# Patient Record
Sex: MALE | Race: WHITE
[De-identification: names, ages, dates, MRNs, and addresses within clinical notes are randomized per-mention and may not be internally consistent; named-entity substitution may affect disease eponyms.]

---

## 2019-08-25 ENCOUNTER — HOSPITAL ENCOUNTER (EMERGENCY)
Dept: HOSPITAL 62 - ER | Age: 44
Discharge: ANOTHER HOSPITAL | End: 2019-08-25
Payer: MEDICAID

## 2019-08-25 VITALS — DIASTOLIC BLOOD PRESSURE: 91 MMHG | SYSTOLIC BLOOD PRESSURE: 134 MMHG

## 2019-08-25 DIAGNOSIS — D72.829: ICD-10-CM

## 2019-08-25 DIAGNOSIS — F17.210: ICD-10-CM

## 2019-08-25 DIAGNOSIS — I10: ICD-10-CM

## 2019-08-25 DIAGNOSIS — G89.18: ICD-10-CM

## 2019-08-25 DIAGNOSIS — K75.0: Primary | ICD-10-CM

## 2019-08-25 LAB
ABSOLUTE LYMPHOCYTES# (MANUAL): 0.9 10^3/UL (ref 0.5–4.7)
ABSOLUTE MONOCYTES # (MANUAL): 1 10^3/UL (ref 0.1–1.4)
ADD MANUAL DIFF: YES
ALBUMIN SERPL-MCNC: 4.8 G/DL (ref 3.5–5)
ALP SERPL-CCNC: 430 U/L (ref 38–126)
ANION GAP SERPL CALC-SCNC: 14 MMOL/L (ref 5–19)
ANISOCYTOSIS BLD QL SMEAR: (no result)
APPEARANCE UR: (no result)
APTT PPP: YELLOW S
AST SERPL-CCNC: 82 U/L (ref 17–59)
BASOPHILS NFR BLD MANUAL: 0 % (ref 0–2)
BILIRUB DIRECT SERPL-MCNC: 0.4 MG/DL (ref 0–0.4)
BILIRUB SERPL-MCNC: 0.6 MG/DL (ref 0.2–1.3)
BILIRUB UR QL STRIP: NEGATIVE
BUN SERPL-MCNC: 18 MG/DL (ref 7–20)
CALCIUM: 10.4 MG/DL (ref 8.4–10.2)
CHLORIDE SERPL-SCNC: 97 MMOL/L (ref 98–107)
CO2 SERPL-SCNC: 29 MMOL/L (ref 22–30)
EOSINOPHIL NFR BLD MANUAL: 0 % (ref 0–6)
ERYTHROCYTE [DISTWIDTH] IN BLOOD BY AUTOMATED COUNT: 15.5 % (ref 11.5–14)
GLUCOSE SERPL-MCNC: 117 MG/DL (ref 75–110)
GLUCOSE UR STRIP-MCNC: NEGATIVE MG/DL
HCT VFR BLD CALC: 40.6 % (ref 37.9–51)
HGB BLD-MCNC: 13.5 G/DL (ref 13.5–17)
KETONES UR STRIP-MCNC: NEGATIVE MG/DL
MCH RBC QN AUTO: 30.1 PG (ref 27–33.4)
MCHC RBC AUTO-ENTMCNC: 33.3 G/DL (ref 32–36)
MCV RBC AUTO: 91 FL (ref 80–97)
MONOCYTES % (MANUAL): 6 % (ref 3–13)
NITRITE UR QL STRIP: NEGATIVE
PH UR STRIP: 6 [PH] (ref 5–9)
PLATELET # BLD: 593 10^3/UL (ref 150–450)
PLATELET CLUMP BLD QL SMEAR: PRESENT
PLATELET COMMENT: (no result)
POTASSIUM SERPL-SCNC: 4.2 MMOL/L (ref 3.6–5)
PROT SERPL-MCNC: 8.4 G/DL (ref 6.3–8.2)
PROT UR STRIP-MCNC: 30 MG/DL
RBC # BLD AUTO: 4.49 10^6/UL (ref 4.35–5.55)
SEGMENTED NEUTROPHILS % (MAN): 89 % (ref 42–78)
SP GR UR STRIP: 1.02
TOTAL CELLS COUNTED BLD: 100
UROBILINOGEN UR-MCNC: 2 MG/DL (ref ?–2)
VARIANT LYMPHS NFR BLD MANUAL: 5 % (ref 13–45)
WBC # BLD AUTO: 17.4 10^3/UL (ref 4–10.5)

## 2019-08-25 PROCEDURE — 36415 COLL VENOUS BLD VENIPUNCTURE: CPT

## 2019-08-25 PROCEDURE — 87040 BLOOD CULTURE FOR BACTERIA: CPT

## 2019-08-25 PROCEDURE — 83690 ASSAY OF LIPASE: CPT

## 2019-08-25 PROCEDURE — 85025 COMPLETE CBC W/AUTO DIFF WBC: CPT

## 2019-08-25 PROCEDURE — 80053 COMPREHEN METABOLIC PANEL: CPT

## 2019-08-25 PROCEDURE — S0119 ONDANSETRON 4 MG: HCPCS

## 2019-08-25 PROCEDURE — 96376 TX/PRO/DX INJ SAME DRUG ADON: CPT

## 2019-08-25 PROCEDURE — 96361 HYDRATE IV INFUSION ADD-ON: CPT

## 2019-08-25 PROCEDURE — 96365 THER/PROPH/DIAG IV INF INIT: CPT

## 2019-08-25 PROCEDURE — 74177 CT ABD & PELVIS W/CONTRAST: CPT

## 2019-08-25 PROCEDURE — 96375 TX/PRO/DX INJ NEW DRUG ADDON: CPT

## 2019-08-25 PROCEDURE — 96372 THER/PROPH/DIAG INJ SC/IM: CPT

## 2019-08-25 PROCEDURE — 81001 URINALYSIS AUTO W/SCOPE: CPT

## 2019-08-25 PROCEDURE — 99284 EMERGENCY DEPT VISIT MOD MDM: CPT

## 2019-08-25 NOTE — RADIOLOGY REPORT (SQ)
EXAM DESCRIPTION:  CT ABD/PELVIS WITH IV   ORAL



COMPLETED DATE/TIME:  8/25/2019 11:27 am



REASON FOR STUDY:  Biliary drain removed 3 days ago, increasing pain



COMPARISON:  None.



TECHNIQUE:  CT scan of the abdomen and pelvis performed with intravenous and oral contrast using lesly
isadora scanning technique with dynamic intravenous contrast injection. Images reviewed with lung, soft t
issue, and bone windows. Reconstructed coronal and sagittal MPR images reviewed. Delayed images for e
valuation of the urinary system also acquired. All images stored on PACS.

All CT scanners at this facility use dose modulation, iterative reconstruction, and/or weight based d
osing when appropriate to reduce radiation dose to as low as reasonably achievable (ALARA).

[facility]: Dose Right  [facility]: CareDose    [facility]: Dose Right    [facility]: Teradose 4D    [facility]: Smart Technologies



CONTRAST TYPE AND DOSE:  contrast/concentration: Isovue 350.00 mg/ml; Total Contrast Delivered: 70.0 
ml; Total Saline Delivered: 66.0 ml



RENAL FUNCTION:  GFR > 60.



RADIATION DOSE:  CT Rad equipment meets quality standard of care and radiation dose reduction techniq
ues were employed. CTDIvol: 3.7 - 4.1 mGy. DLP: 385 mGy-cm. .



LIMITATIONS:  None.



FINDINGS:  LOWER CHEST: No significant findings. No nodules or infiltrates.

LIVER: Prior resection part of the right lobe.  Intrahepatic biliary dilatation.  2.7 cm oval focus o
f low attenuation in right lobe measuring 15 HU.

SPLEEN: Normal size. No focal lesions.

PANCREAS: No masses. No significant calcifications. No adjacent inflammation or peripancreatic fluid 
collections. Pancreatic duct not dilated.

GALLBLADDER: Surgically absent.

ADRENAL GLANDS: No significant masses or asymmetry.

RIGHT KIDNEY AND URETER: No solid masses.   No significant calcifications.   No hydronephrosis or hyd
roureter.

LEFT KIDNEY AND URETER: No solid masses.   No significant calcifications.   No hydronephrosis or hydr
oureter.

AORTA AND VESSELS: No aneurysm. No dissection. Renal arteries, SMA, celiac without stenosis.

RETROPERITONEUM: Small retroperitoneal nodes measuring less than 1 cm in short axis.

BOWEL AND PERITONEAL CAVITY: Anastomosis in the lower abdomen.  Mild gastric distention.  Trace of as
cites.  No free air.

APPENDIX: Surgically absent.

PELVIS: No significant masses.  Normal bladder. No free fluid.

ABDOMINAL WALL: No masses. No hernias.

BONES: Nothing acute.

OTHER: No other significant finding.



IMPRESSION:

1. Low-density lesion in the liver suspicious for abscess or biloma.

2. Chronic biliary dilatation in patient with known stage IV cholangiocarcinoma status post partial h
epatectomy.



TECHNICAL DOCUMENTATION:  JOB ID:  [id number]

Quality ID # 436: Final reports with documentation of one or more dose reduction techniques (e.g., Au
tomated exposure control, adjustment of the mA and/or kV according to patient size, use of iterative 
reconstruction technique)

 2011 [employer]- All Rights Reserved



Reading location - IP/workstation name: [email or web address]

## 2019-08-25 NOTE — ER DOCUMENT REPORT
Entered by KIRK ADAM SCRIBE  08/25/19 0936 





Acting as scribe for:PAPO ELENA MD





ED GI/





- General


Mode of Arrival: Ambulatory


Information source: Patient





<PAPO ELENA - Last Filed: 08/25/19 14:11>





<GROVESERIC - Last Filed: 08/25/19 18:39>





- General


Chief Complaint: Post Surgical Pain


Stated Complaint: ABDOMINAL PAIN


Time Seen by Provider: 08/25/19 09:18


Notes: 





44 year old male with stage IV cholangiocarcinoma that presents to the emergency

department today with complaints of abdominal pain since last night. Patient 

recently had a biliary drain placed for an Klebsiella E. coli liver abscess. 

This biliary drain was removed x3 days ago in [place], [place].  Patient 

states that as the interventional radiologist was removing the drain they stated

that there was no more fluid collection, that the abscess had collapsed, but 

there was still communication between this drain and his liver. Patient reports 

that doctors in [place] "hoped it would eventually close and heal without 

needing another drain placed".  Patient has been on Augmentin for the last 

month. 





Patient states he was on chemotherapy for 3 years which eventually failed. He 

has been in an immunotherapy research program for the last 9 months.  Patient 

states his cancer has remained stable for the last 9 months without any 

improvement or worsening.  (PAPO ELENA)





- Related Data


Allergies/Adverse Reactions: 


                                        





No Known Allergies Allergy (Unverified 08/25/19 10:41)


   











Past Medical History





- General


Information source: Patient





- Social History


Smoking Status: Current Every Day Smoker


Cigarette use (# per day): Yes - 1 ppd


Frequency of alcohol use: None


Drug Abuse: None


Lives with: Family


Family History: Reviewed & Not Pertinent





- Past Medical History


Cardiac Medical History: Reports: Hx Hypertension


Malignancy Medical History: Reports Other - Cholangiocarcinoma


Past Surgical History: Reports: Hx Abdominal Surgery - Hemihepatectomy.  Lulu

-en-Y.  Biliary drain placed for liver abscess.





<PAPO ELENA - Last Filed: 08/25/19 14:11>





Review of Systems





- Review of Systems


Constitutional: denies: Fever


EENT: No symptoms reported


Cardiovascular: No symptoms reported


Respiratory: No symptoms reported


Gastrointestinal: See HPI, Abdominal pain.  denies: Nausea, Vomiting


Genitourinary: No symptoms reported


Male Genitourinary: No symptoms reported


Musculoskeletal: No symptoms reported


Skin: No symptoms reported


Hematologic/Lymphatic: No symptoms reported


Neurological/Psychological: No symptoms reported


-: Yes All other systems reviewed and negative





<PAPO ELENA - Last Filed: 08/25/19 14:11>





Physical Exam





<PAPO ELENA - Last Filed: 08/25/19 14:11>





- Vital signs


Vitals: 





                                        











Temp Pulse Resp BP Pulse Ox


 


 98 F   86   16   144/97 H  100 


 


 08/25/19 08:45  08/25/19 08:45  08/25/19 08:45  08/25/19 08:45  08/25/19 08:45














- Notes


Notes: 





Physical Exam:


 


General: Alert, very thin appearing consistent with history of malignancy. 


 


HEENT: Normocephalic. Atraumatic. PERRL. Extraocular movements intact. 

Oropharynx clear.


 


Neck: Supple. Non-tender.


 


Respiratory: No respiratory distress. Clear and equal breath sounds bilaterally.


 


Cardiovascular: Regular rate and rhythm. 


 


Abdominal: Small stoma in the RUQ where biliary drain was removed. No drainage 

from this stoma. No surrounding erythema. Tenderness with palpation surrounding 

stoma. No distension. Normal Bowel Sounds. 


 


Back: No gross abnormalities. 


 


Extremities: Moves all four extremities.


Upper extremities: Normal inspection. Normal ROM.  


Lower extremities: Normal inspection. No edema. Normal ROM.


 


Neurological: Normal cognition. AAOx4. Normal speech.  


 


Psychological: Normal affect. Normal Mood. 


 


Skin: Warm. Dry. Tanned skin. (PAPO ELENA)





Course





- Laboratory


Result Diagrams: 


                                 08/25/19 10:28





                                 08/25/19 10:28





- Diagnostic Test


Radiology reviewed: Image reviewed, Reports reviewed - CT scan of the abdomen 

pelvis with oral and IV contrast shows a low-density lesion in the liver that is

suspicious for abscess or biloma.





<PAPO ELEAN - Last Filed: 08/25/19 14:11>





- Laboratory


Result Diagrams: 


                                 08/25/19 10:28





                                 08/25/19 10:28





<ERIC GROVES - Last Filed: 08/25/19 18:39>





- Re-evaluation


Re-evalutation: 





08/25/19 14:12


Discussed with the radiologist, he called the interventional radiologist and 

they decided they wanted me to get the patient transferred to their facility 

where they could get together and review the images and try to get labs and 

images from the facility in [place] for comparison. (PAPO ELENA)





08/25/19 18:35


Patient seen and examined, note reviewed, signout received from Dr. Elena.  

Patient was apparently not accepted at 1 of the facilities called, therefore I 

patient called to [facility], discussed the case with the general 

hospitalist Dr. Anne, who wished to discuss the case with the oncology team, I 

spoke with Dr. Gonzalez with oncology, who stated they could see the patient, and 

will be accepted to the hospitalist service, patient while in the ED and while I

was monitoring him, was still having some pain, is given additional Dilaudid, 

and a dose of Reglan for his nausea, it was discussed with him that he would be 

transferred to [place], which she was agreeable to, for IR placement of a 

drain.








Laboratory











  08/25/19 08/25/19 08/25/19





  10:28 10:28 10:28


 


WBC  17.4 H  


 


RBC  4.49  


 


Hgb  13.5  


 


Hct  40.6  


 


MCV  91  


 


MCH  30.1  


 


MCHC  33.3  


 


RDW  15.5 H  


 


Plt Count  593 H  


 


Lymph % (Auto)  Not Reportable  


 


Mono % (Auto)  Not Reportable  


 


Eos % (Auto)  Not Reportable  


 


Baso % (Auto)  Not Reportable  


 


Absolute Neuts (auto)  Not Reportable  


 


Absolute Lymphs (auto)  Not Reportable  


 


Absolute Monos (auto)  Not Reportable  


 


Absolute Eos (auto)  Not Reportable  


 


Absolute Basos (auto)  Not Reportable  


 


Total Counted  100  


 


Seg Neutrophils %  Not Reportable  


 


Seg Neuts % (Manual)  89 H  


 


Lymphocytes % (Manual)  5 L  


 


Monocytes % (Manual)  6  


 


Eosinophils % (Manual)  0  


 


Basophils % (Manual)  0  


 


Abs Neuts (Manual)  15.5 H  


 


Abs Lymphs (Manual)  0.9  


 


Abs Monocytes (Manual)  1.0  


 


Absolute Eos (Manual)  0.0  


 


Abs Basophils (Manual)  0.0  


 


Clumped Platelets  PRESENT  


 


Platelet Comment  INCREASED  


 


Anisocytosis  1+  


 


Sodium   139.8 


 


Potassium   4.2 


 


Chloride   97 L 


 


Carbon Dioxide   29 


 


Anion Gap   14 


 


BUN   18 


 


Creatinine   1.24 


 


Est GFR ( Amer)   > 60 


 


Est GFR (MDRD) Non-Af   > 60 


 


Glucose   117 H 


 


Calcium   10.4 H 


 


Total Bilirubin   0.6 


 


Direct Bilirubin   0.4 


 


Neonat Total Bilirubin   Not Reportable 


 


Neonat Direct Bilirubin   Not Reportable 


 


Neonat Indirect Bili   Not Reportable 


 


AST   82 H 


 


ALT   73 


 


Alkaline Phosphatase   430 H 


 


Total Protein   8.4 H 


 


Albumin   4.8 


 


Lipase   404.6 H 


 


Urine Color    YELLOW


 


Urine Appearance    SLIGHTLY-CLOUDY


 


Urine pH    6.0


 


Ur Specific Gravity    1.023


 


Urine Protein    30 H


 


Urine Glucose (UA)    NEGATIVE


 


Urine Ketones    NEGATIVE


 


Urine Blood    SMALL H


 


Urine Nitrite    NEGATIVE


 


Urine Bilirubin    NEGATIVE


 


Urine Urobilinogen    2.0 H


 


Ur Leukocyte Esterase    NEGATIVE


 


Urine WBC (Auto)    1


 


Urine RBC (Auto)    5


 


U Hyaline Cast (Auto)    3


 


Urine Mucus (Auto)    FEW


 


Urine Ascorbic Acid    NEGATIVE











                                        





Abdomen/Pelvis CT  08/25/19 09:33


IMPRESSION:


1. Low-density lesion in the liver suspicious for abscess or biloma.


2. Chronic biliary dilatation in patient with known stage IV cholangiocarcinoma 

status post partial hepatectomy.


 








 (ERIC GROVES)





- Vital Signs


Vital signs: 





                                        











Temp Pulse Resp BP Pulse Ox


 


 98 F   86   16   144/97 H  100 


 


 08/25/19 08:45  08/25/19 08:45  08/25/19 08:45  08/25/19 08:45  08/25/19 08:45














- Laboratory


Laboratory results interpreted by me: 





                                        











  08/25/19 08/25/19 08/25/19





  10:28 10:28 10:28


 


WBC  17.4 H  


 


RDW  15.5 H  


 


Plt Count  593 H  


 


Seg Neuts % (Manual)  89 H  


 


Lymphocytes % (Manual)  5 L  


 


Abs Neuts (Manual)  15.5 H  


 


Chloride   97 L 


 


Glucose   117 H 


 


Calcium   10.4 H 


 


AST   82 H 


 


Alkaline Phosphatase   430 H 


 


Total Protein   8.4 H 


 


Lipase   404.6 H 


 


Urine Protein    30 H


 


Urine Blood    SMALL H


 


Urine Urobilinogen    2.0 H














Discharge





<PAPO ELENA - Last Filed: 08/25/19 14:11>





<ERIC GROVES - Last Filed: 08/25/19 18:39>





- Discharge


Clinical Impression: 


 Liver abscess





Leukocytosis


Qualifiers:


 Leukocytosis type: unspecified Qualified Code(s): D72.829 - Elevated white 

blood cell count, unspecified





Condition: Stable


Disposition: [facility]


Scribe Attestation: 





08/25/19 09:42


I personally performed the services described in the documentation, reviewed and

edited the documentation which was dictated to the scribe in my presence, and it

accurately records my words and actions. (PAPO ELENA)





I personally performed the services described in the documentation, reviewed and

edited the documentation which was dictated to the scribe in my presence, and it

accurately records my words and actions.